# Patient Record
Sex: MALE | Race: WHITE | Employment: UNEMPLOYED | URBAN - METROPOLITAN AREA
[De-identification: names, ages, dates, MRNs, and addresses within clinical notes are randomized per-mention and may not be internally consistent; named-entity substitution may affect disease eponyms.]

---

## 2018-06-30 PROBLEM — F11.20 OPIATE ADDICTION (HCC): Status: ACTIVE | Noted: 2018-06-30

## 2020-06-14 ENCOUNTER — HOSPITAL ENCOUNTER (EMERGENCY)
Age: 30
Discharge: HOME | End: 2020-06-14
Attending: EMERGENCY MEDICINE
Payer: SELF-PAY

## 2020-06-14 VITALS
WEIGHT: 190 LBS | BODY MASS INDEX: 27.2 KG/M2 | RESPIRATION RATE: 18 BRPM | SYSTOLIC BLOOD PRESSURE: 121 MMHG | HEART RATE: 104 BPM | TEMPERATURE: 98 F | OXYGEN SATURATION: 97 % | DIASTOLIC BLOOD PRESSURE: 76 MMHG | HEIGHT: 70 IN

## 2020-06-14 DIAGNOSIS — T40.1X1A ACCIDENTAL OVERDOSE OF HEROIN, INITIAL ENCOUNTER (HCC): Primary | ICD-10-CM

## 2020-06-14 PROCEDURE — 99284 EMERGENCY DEPT VISIT MOD MDM: CPT

## 2020-06-14 RX ORDER — NALOXONE HYDROCHLORIDE 4 MG/.1ML
SPRAY NASAL
Qty: 1 EACH | Refills: 1 | Status: SHIPPED | OUTPATIENT
Start: 2020-06-14

## 2020-06-14 NOTE — ED NOTES
Pt laying in bed. A&OX4. NAD ntoed. C/o headed. Denies cp or sob. Discussed poc. Call bell in reach.

## 2020-06-14 NOTE — ED TRIAGE NOTES
Pt arrives via EMS from [facility] with friends, EMS states unconscious when police arrived and was given narcan after shooting oxycodone. EMS states alert and oriented x4 and cooperative on arrival. 100/56, hr 110, bgl 138, 97.6 oral. Pt respirations even and unlabored, masked.

## 2020-06-14 NOTE — ED PROVIDER NOTES
Patient presenting after accidental narcotic overdose  Reports that he used to use heroin frequently but has not been using for quite some time  Got back into it recently and overdid it  Denies intention to harm himself or others  Has a roommate at bedside who is also recovering but is currently on chronic methadone therapy with no relapses        Drug Overdose          Past Medical History:   Diagnosis Date    Psychiatric disorder     drug abuse     Substance abuse ([facility] [address]     Heroin, cocaine, methamphetamine       No past surgical history on file. No family history on file.     Social History     Socioeconomic History    Marital status: SINGLE     Spouse name: Not on file    Number of children: Not on file    Years of education: Not on file    Highest education level: Not on file   Occupational History    Not on file   Social Needs    Financial resource strain: Not on file    Food insecurity     Worry: Not on file     Inability: Not on file    Transportation needs     Medical: Not on file     Non-medical: Not on file   Tobacco Use    Smoking status: Current Every Day Smoker     Packs/day: 1.00    Smokeless tobacco: Never Used   Substance and Sexual Activity    Alcohol use: No    Drug use: Yes     Types: Methamphetamines, Cocaine, Heroin, Marijuana    Sexual activity: Never   Lifestyle    Physical activity     Days per week: Not on file     Minutes per session: Not on file    Stress: Not on file   Relationships    Social connections     Talks on phone: Not on file     Gets together: Not on file     Attends [employer] service: Not on file     Active member of club or organization: Not on file     Attends meetings of clubs or organizations: Not on file     Relationship status: Not on file    Intimate partner violence     Fear of current or ex partner: Not on file     Emotionally abused: Not on file     Physically abused: Not on file     Forced sexual activity: Not on file   Other Topics Concern  Not on file   Social History Narrative    Not on file         ALLERGIES: Patient has no known allergies. Review of Systems   Psychiatric/Behavioral: Positive for behavioral problems and confusion. All other systems reviewed and are negative. Vitals:    06/14/20 1659   BP: 124/56   Pulse: (!) 105   Resp: 19   Temp: 97.6 °F (36.4 °C)   SpO2: 97%   Weight: 86.2 kg (190 lb)   Height: 5' 10\" (1.778 m)            Physical Exam  Vitals signs and nursing note reviewed. Constitutional:       Appearance: He is well-developed. HENT:      Head: Normocephalic and atraumatic. Eyes:      Conjunctiva/sclera: Conjunctivae normal.      Pupils: Pupils are equal, round, and reactive to light. Neck:      Musculoskeletal: Normal range of motion and neck supple. Cardiovascular:      Rate and Rhythm: Normal rate and regular rhythm. Heart sounds: Normal heart sounds. Pulmonary:      Effort: Pulmonary effort is normal.      Breath sounds: Normal breath sounds. Abdominal:      General: Bowel sounds are normal.      Palpations: Abdomen is soft. Musculoskeletal: Normal range of motion. General: No deformity. Skin:     General: Skin is warm and dry. Neurological:      Mental Status: He is alert and oriented to person, place, and time. Cranial Nerves: No cranial nerve deficit. Psychiatric:         Behavior: Behavior normal.          MDM  Number of Diagnoses or Management Options  Accidental overdose of heroin, initial encounter [facility]):   Diagnosis management comments: 82-year-old male presenting after accidental heroin/fentanyl overdose. Denies any intent to harm himself or others. Has a friend at bedside who will help him get back into a program.  Offered him favor and he states he is already been in touch with him.   Monitored for several hours and the patient's vital signs have been normal, no signs of re-sedation    Risk of Complications, Morbidity, and/or Mortality  Presenting problems: moderate  Diagnostic procedures: moderate  Management options: moderate    Patient Progress  Patient progress: improved         Procedures